# Patient Record
Sex: FEMALE | Race: WHITE | NOT HISPANIC OR LATINO | URBAN - METROPOLITAN AREA
[De-identification: names, ages, dates, MRNs, and addresses within clinical notes are randomized per-mention and may not be internally consistent; named-entity substitution may affect disease eponyms.]

---

## 2017-06-23 ENCOUNTER — OFFICE (OUTPATIENT)
Dept: URBAN - METROPOLITAN AREA CLINIC 101 | Facility: CLINIC | Age: 82
End: 2017-06-23
Payer: OTHER GOVERNMENT

## 2017-06-23 VITALS
WEIGHT: 128 LBS | DIASTOLIC BLOOD PRESSURE: 75 MMHG | HEART RATE: 70 BPM | TEMPERATURE: 97.7 F | SYSTOLIC BLOOD PRESSURE: 119 MMHG | HEIGHT: 63 IN

## 2017-06-23 DIAGNOSIS — R19.7 DIARRHEA, UNSPECIFIED: ICD-10-CM

## 2017-06-23 DIAGNOSIS — Z80.0 FAMILY HISTORY OF MALIGNANT NEOPLASM OF DIGESTIVE ORGANS: ICD-10-CM

## 2017-06-23 DIAGNOSIS — R93.5 ABNORMAL FINDINGS ON DIAGNOSTIC IMAGING OF OTHER ABDOMINAL R: ICD-10-CM

## 2017-06-23 DIAGNOSIS — K57.32 DIVERTICULITIS OF LARGE INTESTINE WITHOUT PERFORATION OR ABS: ICD-10-CM

## 2017-06-23 LAB
LIPASE: 226 U/L
LIPASE: PERFORMING LOCATION: (no result)
TSH HIGH SENSITIVITY (3RD GEN): 1 MIU/ML

## 2017-06-23 PROCEDURE — 99204 OFFICE O/P NEW MOD 45 MIN: CPT

## 2017-06-23 NOTE — SERVICEHPINOTES
Susanne Cooper is  a 86 YO female who seen in consultation at request of Dr. Dallas Hayes for chronic diarrhea. Her daughter is here today. She has had new diarrhea for six months. 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 She had weakness , an episode of vomiting and abdominal cramps , and went to ED . No fever. Her CBC showed high 15.4 normal H/H  slightly low potassium and sodium.  Her 6-2017 abdominal CT showed sigmoid diverticulitis, or focal colitis with inflammation of adjacent bowel loops of small bowel . Her sister has colon cancer and she has never had a colonoscopy.  She has never had rectal bleeding, constipation. She was placed on Cipro and Flagyl and diarrhea has improved. Her daughter states she feels Cipro and Flagyl may be making her mentally foggy. She is eating and trying to drink more. She may have urinary abnormalities and urinates more at night. No dysuria. She denies any abdominal pain, recent weight loss.

## 2017-06-27 LAB
CELIAC DISEASE COMP - REF: DEAMIDATED GLIADIN, IGA - REF: 6 UNITS
CELIAC DISEASE COMP - REF: DEAMIDATED GLIADIN, IGG - REF: 3 UNITS
CELIAC DISEASE COMP - REF: ENDOMYSIAL ANTIBODY IGA - REF: NEGATIVE
CELIAC DISEASE COMP - REF: IMMUNOGLOBULIN A, QN, SERUM: 168 MG/DL
CELIAC DISEASE COMP - REF: PERFORMING LOCATION: (no result)
CELIAC DISEASE COMP - REF: T-TRANSGLUTAMINASE IGA - REF: <2 U/ML
CELIAC DISEASE COMP - REF: T-TRANSGLUTAMINASE IGG - REF: 3 U/ML

## 2017-08-01 ENCOUNTER — OFFICE (OUTPATIENT)
Dept: URBAN - METROPOLITAN AREA CLINIC 101 | Facility: CLINIC | Age: 82
End: 2017-08-01
Payer: OTHER GOVERNMENT

## 2017-08-01 VITALS
SYSTOLIC BLOOD PRESSURE: 123 MMHG | HEIGHT: 63 IN | WEIGHT: 125 LBS | DIASTOLIC BLOOD PRESSURE: 76 MMHG | TEMPERATURE: 97.9 F | HEART RATE: 67 BPM

## 2017-08-01 DIAGNOSIS — R85.89 OTHER ABNORMAL FINDINGS IN SPECIMENS FROM DIGESTIVE ORGANS A: ICD-10-CM

## 2017-08-01 DIAGNOSIS — R19.7 DIARRHEA, UNSPECIFIED: ICD-10-CM

## 2017-08-01 DIAGNOSIS — I38 ENDOCARDITIS, VALVE UNSPECIFIED: ICD-10-CM

## 2017-08-01 DIAGNOSIS — I05.9 RHEUMATIC MITRAL VALVE DISEASE, UNSPECIFIED: ICD-10-CM

## 2017-08-01 DIAGNOSIS — Z80.0 FAMILY HISTORY OF MALIGNANT NEOPLASM OF DIGESTIVE ORGANS: ICD-10-CM

## 2017-08-01 PROCEDURE — 99214 OFFICE O/P EST MOD 30 MIN: CPT

## 2017-08-01 NOTE — SERVICEHPINOTES
This 84 YO patient is here for follow up of diarrhea and a new positive Cologuard. We discussed at last visit that she would need a colonoscopy if her Cologuard were positive. Her daughter , Norah , is here today and reports now patient does not want to have a colonoscopy. She " does not want to know " if she has colon cancer and has changed her mind. She is scheduled for a colonoscopy in three weeks. She was having frequent diarrhea , and now this is almost gone . Her daughter called after last visit to report that diarrhea went away, and patient did not get stool studies. She has diarrhea once every 1-2 weeks now. She has a normal BM every 1-2 days. Her sister had colon cancer. She denies rectal bleeding, abdominal pain, vomiting, constipation. He has slight anemia 10.9/32 normal MCV. She has never had a colonoscopy. She has intermittent constipation and takes Miralax as needed. She has lost three pounds since last month. She sees a cardiologist, Dr Herron, next week. She has a h/o mitral valve repair and endocarditis. She will also talk about getting a colonoscopy with the cardiologist. No chest pain or SOB. Her daughter also reports that she would like patient to see PCP to discuss whether to get a colonoscopy.

## 2017-08-09 ENCOUNTER — OFFICE (OUTPATIENT)
Dept: URBAN - METROPOLITAN AREA CLINIC 101 | Facility: CLINIC | Age: 82
End: 2017-08-09

## 2017-08-09 PROCEDURE — 00031: CPT

## 2017-09-07 ENCOUNTER — AMBULATORY SURGICAL CENTER (OUTPATIENT)
Dept: URBAN - METROPOLITAN AREA SURGERY 21 | Facility: SURGERY | Age: 82
End: 2017-09-07
Payer: OTHER GOVERNMENT

## 2017-09-07 DIAGNOSIS — C20 MALIGNANT NEOPLASM OF RECTUM: ICD-10-CM

## 2017-09-07 DIAGNOSIS — R19.5 OTHER FECAL ABNORMALITIES: ICD-10-CM

## 2017-09-07 DIAGNOSIS — R19.7 DIARRHEA, UNSPECIFIED: ICD-10-CM

## 2017-09-07 PROCEDURE — 45380 COLONOSCOPY AND BIOPSY: CPT | Mod: 53

## 2020-10-19 ENCOUNTER — OFFICE (OUTPATIENT)
Dept: URBAN - METROPOLITAN AREA CLINIC 102 | Facility: CLINIC | Age: 85
End: 2020-10-19
Payer: OTHER GOVERNMENT

## 2020-10-19 VITALS
TEMPERATURE: 97.5 F | SYSTOLIC BLOOD PRESSURE: 133 MMHG | HEART RATE: 73 BPM | DIASTOLIC BLOOD PRESSURE: 84 MMHG | WEIGHT: 124 LBS | HEIGHT: 63 IN

## 2020-10-19 DIAGNOSIS — C20 MALIGNANT NEOPLASM OF RECTUM: ICD-10-CM

## 2020-10-19 DIAGNOSIS — R14.1 GAS PAIN: ICD-10-CM

## 2020-10-19 DIAGNOSIS — Z93.3 COLOSTOMY STATUS: ICD-10-CM

## 2020-10-19 LAB — CEA: 4.1 NG/ML (ref 0–4.7)

## 2020-10-19 PROCEDURE — 99204 OFFICE O/P NEW MOD 45 MIN: CPT | Performed by: INTERNAL MEDICINE
